# Patient Record
Sex: FEMALE | Race: WHITE
[De-identification: names, ages, dates, MRNs, and addresses within clinical notes are randomized per-mention and may not be internally consistent; named-entity substitution may affect disease eponyms.]

---

## 2017-08-04 ENCOUNTER — HOSPITAL ENCOUNTER (EMERGENCY)
Dept: HOSPITAL 74 - FER | Age: 27
Discharge: HOME | End: 2017-08-04
Payer: COMMERCIAL

## 2017-08-04 VITALS — SYSTOLIC BLOOD PRESSURE: 150 MMHG | DIASTOLIC BLOOD PRESSURE: 90 MMHG | HEART RATE: 110 BPM | TEMPERATURE: 98.4 F

## 2017-08-04 VITALS — BODY MASS INDEX: 41.8 KG/M2
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
## 2017-08-04 DIAGNOSIS — E66.01: ICD-10-CM
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
## 2017-08-04 DIAGNOSIS — M25.572: Primary | ICD-10-CM
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
## 2017-08-04 NOTE — PDOC
History of Present Illness





- General


Chief Complaint: Pain


Stated Complaint: PAIN TO LEFT LOWER LEG S/P FALL


Time Seen by Provider: 08/04/17 22:49


History Source: Patient


Exam Limitations: No Limitations





- History of Present Illness


Initial Comments: 





08/04/17 23:00


This is a morbidly obese 27-year-old female who was at a restaurant when she 

slipped on a wet floor and fell landing on her 3-year-old daughter child was 

not injured however mom said that she has fibromyalgia and chronically has pain 

in her leg but it is worse now that she fell. Mom is complaining of pain in her 

left ankle and knee. However mom was able to get up off of the floor and 

ambulate post falling. Mom said she did not hit her head or pass out and is 

complaining of pain in her left leg ankle and knee.





 PAST MEDICAL HISTORY:  no significant history





PAST SURGICAL HISTORY:  no significant history





FAMILY HISTORY:  no pertinant history





SOCIAL HISTORY:  Pt lives with  family and is employed.





MEDICATIONS:  reviewed





ALLERGIES:  As per nursing notes





Review of Systems


General:  No fevers or chills, no weakness, no weight loss 


HEENT: No change in vision.  No sore throat,. No ear pain


CardioVascular:  No chest pain or shortness of breath


Respiratory:No cough, or wheezing. 


Gastrointestinal:  no nausea, vomitting, diarrhea or constipation,  No rectal 

bleeding


Genitourinary:  No dysuria, hematuria, or frequency


Musculoskeletal: Left leg pain


Neurologic: No headache, vertigo, dizziness or loss of consciousness


Psychiatric: nor depression 


Skin: No rashes or easy bruising


Endocrine: no increased thirst or abnormal weight change


Allergic: no skin or latex allergy


All other systems reviewed and normal








GENERAL: The patient is awake, alert, and fully oriented, in no acute distress.


HEAD: Normal with no signs of trauma.


EYES: Pupils equal, round and reactive to light, extraocular movements intact, 

sclera anicteric, conjunctiva clear.


EXTREMITIES: Left ankle there is no swelling or ecchymosis of the ankle however 

patient complains of diffuse pain to the ankle area. On palpation. In addition 

to the ankle patient is complaining of pain on palpation of the left knee 

diffusely. Patient complains of pain when I try to range of motion either her 

ankle or knee. There is no swelling, ecchymosis of the knee. There is no 

ligamentous instability. Neurovascular is intact  


NEUROLOGICAL: Normal speech, normal gait.


PSYCH: Normal mood, normal affect.


SKIN: Warm, Dry, normal turgor, no rashes or lesions noted.





*DC/Admit/Observation/Transfer


Diagnosis at time of Disposition: 


 Left ankle pain





- Discharge Dispostion


Disposition: HOME


Condition at time of disposition: Good


Admit: No





- Patient Instructions


Additional Instructions: 


Tylenol or Motrin as needed for the pain  take as directed on the bottle.





Return to the emergency department immediately with ANY new, persistent or 

worsening symptoms.





Continue any medications as previously prescribed by your physician.





You should follow up with your primary doctor as soon as possible regarding 

today's emergency department visit.


.


Please make sure your doctor reviews the results of your emergency evaluation.





Thank you for coming to the   Emergency Department today for your care. It was 

a pleasure to see you today. Please note that your evaluation is INCOMPLETE 

until you  follow-up with your doctor.

## 2018-06-15 ENCOUNTER — HOSPITAL ENCOUNTER (EMERGENCY)
Dept: HOSPITAL 74 - FER | Age: 28
Discharge: HOME | End: 2018-06-15
Payer: COMMERCIAL

## 2018-06-15 VITALS — HEART RATE: 83 BPM | SYSTOLIC BLOOD PRESSURE: 158 MMHG | TEMPERATURE: 99 F | DIASTOLIC BLOOD PRESSURE: 99 MMHG

## 2018-06-15 VITALS — BODY MASS INDEX: 47.2 KG/M2

## 2018-06-15 DIAGNOSIS — R51: Primary | ICD-10-CM

## 2018-06-15 DIAGNOSIS — Z87.891: ICD-10-CM

## 2018-06-15 LAB
ALBUMIN SERPL-MCNC: 3.7 G/DL (ref 3.5–5)
ALP SERPL-CCNC: 56 U/L (ref 32–92)
ALT SERPL-CCNC: 21 U/L (ref 10–40)
ANION GAP SERPL CALC-SCNC: 7 MMOL/L (ref 8–16)
AST SERPL-CCNC: 17 U/L (ref 10–42)
BASOPHILS # BLD: 0.8 % (ref 0–2)
BILIRUB SERPL-MCNC: 0.6 MG/DL (ref 0.2–1)
BUN SERPL-MCNC: 13 MG/DL (ref 7–18)
CALCIUM SERPL-MCNC: 8.9 MG/DL (ref 8.4–10.2)
CHLORIDE SERPL-SCNC: 102 MMOL/L (ref 98–107)
CO2 SERPL-SCNC: 26 MMOL/L (ref 22–28)
CREAT SERPL-MCNC: 0.8 MG/DL (ref 0.6–1.3)
DEPRECATED RDW RBC AUTO: 12.3 % (ref 11.6–15.6)
EOSINOPHIL # BLD: 0.5 % (ref 0–4.5)
GLUCOSE SERPL-MCNC: 110 MG/DL (ref 74–106)
HCT VFR BLD CALC: 43.4 % (ref 32.4–45.2)
HGB BLD-MCNC: 14.8 GM/DL (ref 10.7–15.3)
LIPASE SERPL-CCNC: 206 U/L (ref 73–393)
LYMPHOCYTES # BLD: 34.6 % (ref 8–40)
MAGNESIUM SERPL-MCNC: 1.8 MG/DL (ref 1.8–2.4)
MCH RBC QN AUTO: 30.1 PG (ref 25.7–33.7)
MCHC RBC AUTO-ENTMCNC: 34 G/DL (ref 32–36)
MCV RBC: 88.6 FL (ref 80–96)
MONOCYTES # BLD AUTO: 6.7 % (ref 3.8–10.2)
NEUTROPHILS # BLD: 57.4 % (ref 42.8–82.8)
PLATELET # BLD AUTO: 203 K/MM3 (ref 134–434)
PMV BLD: 10 FL (ref 7.5–11.1)
POTASSIUM SERPLBLD-SCNC: 3.8 MMOL/L (ref 3.5–5.1)
PROT SERPL-MCNC: 7 G/DL (ref 6.4–8.3)
RBC # BLD AUTO: 4.9 M/MM3 (ref 3.6–5.2)
SODIUM SERPL-SCNC: 135 MMOL/L (ref 136–145)
WBC # BLD AUTO: 5.6 K/MM3 (ref 4–10.8)

## 2018-06-15 PROCEDURE — 3E033GC INTRODUCTION OF OTHER THERAPEUTIC SUBSTANCE INTO PERIPHERAL VEIN, PERCUTANEOUS APPROACH: ICD-10-PCS

## 2018-06-15 PROCEDURE — 3E0337Z INTRODUCTION OF ELECTROLYTIC AND WATER BALANCE SUBSTANCE INTO PERIPHERAL VEIN, PERCUTANEOUS APPROACH: ICD-10-PCS

## 2018-06-15 PROCEDURE — 3E0333Z INTRODUCTION OF ANTI-INFLAMMATORY INTO PERIPHERAL VEIN, PERCUTANEOUS APPROACH: ICD-10-PCS

## 2018-06-15 NOTE — PDOC
History of Present Illness





- General


Chief Complaint: Headache


Stated Complaint: HEADACHE & DIZZINESS


Time Seen by Provider: 06/15/18 10:08


History Source: Patient, Family


Exam Limitations: No Limitations





- History of Present Illness


Initial Comments: 





06/15/18 10:36





28-year-old female patient with history of fibromyalgia, obesity presents with 

headache since yesterday. Patient reports that she developed a gradual shooting-

like headache that was global. Has associated photophobia and phonophobia. Says 

the headache was constant. The headache persisted into today. Denies prior 

history of migraines or family history. Did have a history approximately 3 

years ago for somewhat type headache in which she had preeclampsia and was 

induced. Patient is currently using an IUD. Denies any numbness but really 

feels weak. Endorses nausea but denies vomiting. Denies fevers or chills.





Past History





- Past Medical History


Allergies/Adverse Reactions: 


 Allergies











Allergy/AdvReac Type Severity Reaction Status Date / Time


 


barley Allergy Unknown  Verified 06/15/18 10:11


 


blue dye Allergy Unknown  Verified 06/15/18 10:11


 


iodine Allergy Unknown  Verified 06/15/18 10:11


 


nortriptyline Allergy Unknown  Verified 06/15/18 10:11


 


TYLENOL COMPOUND Allergy Unknown  Uncoded 06/15/18 10:11











Home Medications: 


Ambulatory Orders





Metoclopramide HCl [Reglan] 10 mg PO Q6H PRN #15 tablet 06/15/18 


Naproxen 500 mg PO BID PRN #20 tablet 06/15/18 








COPD: No





- Suicide/Smoking/Psychosocial Hx


Smoking History: Former smoker


Have you smoked in the past 12 months: Yes


If you are a former smoker, when did you quit?: JAN 2018


Information on smoking cessation initiated: Yes


'Breaking Loose' booklet given: 06/15/18


Hx Alcohol Use: Yes


Drug/Substance Use Hx: No


Substance Use Type: None





**Review of Systems





- Review of Systems


Able to Perform ROS?: Yes


Comments:: 





06/15/18 10:39





GENERAL/CONSTITUTIONAL: No fever, weakness. 


HEAD, EYES, EARS, NOSE AND THROAT: No change in vision. No ear pain or 

discharge. No sore throat.


CARDIOVASCULAR: No chest pain or shortness of breath.


RESPIRATORY: No cough, wheezing, or hemoptysis.


GASTROINTESTINAL: No abdominal pain, nausea, vomiting, diarrhea, or decreased 

PO intolerance. 


GENITOURINARY: No dysuria, frequency, or change in urination.


MUSCULOSKELETAL: No joint or muscle swelling or pain. No neck or back pain.


SKIN: No rash


NEUROLOGIC: +headache. No vertigo, loss of consciousness, or change in strength/

sensation.


ENDOCRINE: No increased thirst. No abnormal weight change.


HEMATOLOGIC/LYMPHATIC: No anemia, easy bleeding, or history of blood clots.


ALLERGIC/IMMUNOLOGIC: No hives or skin allergy. 





*Physical Exam





- Vital Signs


 Last Vital Signs











Temp Pulse Resp BP Pulse Ox


 


 99.0 F   83   17   158/99   97 


 


 06/15/18 10:08  06/15/18 10:08  06/15/18 10:08  06/15/18 10:08  06/15/18 10:08














- Physical Exam


Comments: 





06/15/18 10:40





GENERAL: Awake, alert, and fully oriented. Obese.


EYES: PERRLA, EOMI, sclera anicteric, conjunctiva clear


ENT: Auricles normal inspection, hearing grossly normal, nares patent


NECK: Normal ROM, supple.


LUNGS: Breath sounds equal, clear to auscultation bilaterally.  No wheezes, and 

no crackles


HEART: Regular rate and rhythm, normal S1 and S2, no murmurs, rubs or gallops


ABDOMEN: Soft, nontender,.  No guarding, no rebound.  No masses


EXTREMITIES: Normal range of motion, no edema.  No clubbing or cyanosis. No 

cords, erythema, or tenderness


NEUROLOGICAL: Cranial nerves II through XII intact.  Normal speech. 5/5 

strength upper and lower extremities. No pronator drift. Finger to nose normal. 

No dysarthria. Rapid alternating intact.


SKIN: Warm, Dry, normal turgor, no rashes or lesions noted.





ED Treatment Course





- LABORATORY


CBC & Chemistry Diagram: 


 06/15/18 10:40





 06/15/18 10:40





- RADIOLOGY


Radiology Studies Ordered: 














 Category Date Time Status


 


 HEAD CT WITHOUT CONTRAST [CT] Stat CT Scan  06/15/18 10:23 Ordered














Medical Decision Making





- Medical Decision Making





06/15/18 10:42





 Vital Signs











Temp Pulse Resp BP Pulse Ox


 


 99.0 F   83   17   158/99   97 


 


 06/15/18 10:08  06/15/18 10:08  06/15/18 10:08  06/15/18 10:08  06/15/18 10:08








Differential includes migraines, pseudotumor cerebri vs. less likely 

intracranial pathology i.e. masses, tumors.


Low suspicion for ICH at this time given history and presentation.


Head CT


Labs


Medications to treat for migraines and reassess.


06/15/18 13:30





Head CT reviewed. No acute findings.





 CBC, BMP





 06/15/18 10:40 





 06/15/18 10:40 





 CMP











Sodium  135 mmol/L (136-145)  L  06/15/18  10:40    


 


Potassium  3.8 mmol/L (3.5-5.1)   06/15/18  10:40    


 


Chloride  102 mmol/L ()   06/15/18  10:40    


 


Carbon Dioxide  26 mmol/L (22-28)   06/15/18  10:40    


 


Anion Gap  7  (8-16)  L  06/15/18  10:40    


 


BUN  13 mg/dl (7-18)   06/15/18  10:40    


 


Creatinine  0.8 mg/dl (0.6-1.3)   06/15/18  10:40    


 


Creat Clearance w eGFR  > 60  (>60)   06/15/18  10:40    


 


Random Glucose  110 mg/dl ()  H  06/15/18  10:40    


 


Calcium  8.9 mg/dl (8.4-10.2)   06/15/18  10:40    


 


Magnesium  1.8 mg/dL (1.8-2.4)   06/15/18  10:40    


 


Total Bilirubin  0.6 mg/dl (0.2-1.0)   06/15/18  10:40    


 


AST  17 U/L (10-42)   06/15/18  10:40    


 


ALT  21 U/L (10-40)   06/15/18  10:40    


 


Alkaline Phosphatase  56 U/L (32-92)   06/15/18  10:40    


 


Total Protein  7.0 g/dl (6.4-8.3)   06/15/18  10:40    


 


Albumin  3.7 g/dl (3.5-5.0)   06/15/18  10:40    


 


Lipase  206 U/L ()   06/15/18  10:40    


 


Serum Pregnancy, Qual  Negative   06/15/18  11:20    








Pt reports feeling significantly better with IV reglan, benadryl, toradol and 

dexamethasone.


Pt does endorse to me that she did have a childhood history of frequent 

migraines.


Will treat as a migraine exacerbation. Will give a referral to a neurologist.


Return precautions given including worsening headache.


Pt's  will drive patient home.





I discussed the physical exam findings, ancillary test results and final 

diagnoses with the patient.  I answered all of the patient's questions.  The 

patient was satisfied with the care received and felt comfortable with the 

discharge plan and treatment plan.  The patient will call their primary care 

physician within 24 hours to arrange follow-up and will return to the Emergency 

Department with any new, persistant or worsening symptoms. 





*DC/Admit/Observation/Transfer


Diagnosis at time of Disposition: 


Headache


Qualifiers:


 Headache type: unspecified Headache chronicity pattern: acute headache 

Intractability: not intractable Qualified Code(s): R51 - Headache








- Discharge Dispostion


Disposition: HOME


Condition at time of disposition: Good


Decision to Admit order: No





- Prescriptions


Prescriptions: 


Metoclopramide HCl [Reglan] 10 mg PO Q6H PRN #15 tablet


 PRN Reason: Headache/Nausea


Naproxen 500 mg PO BID PRN #20 tablet


 PRN Reason: Headache





- Referrals


Referrals: 


Melissa Laguna MD [Primary Care Provider] - 


Thong Garner MD [Staff Physician] - 





- Patient Instructions


Printed Discharge Instructions:  DI for Headache


Additional Instructions: 


Your Head CT is negative for acute findings.


Please take 500 mg naproxen every 12 hours as needed for headache.


For additional relief, you may take a tablet of reglan every 6 to 8 hours as 

needed.


Please make an appointment with a neurologist.


Call to schedule an appointment.





If you have uncontrollable headache, persistent vomiting, please call your 

doctor or return to the ER for further evaluation.





- Post Discharge Activity


Forms/Work/School Notes:  Back to Work

## 2020-08-06 ENCOUNTER — HOSPITAL ENCOUNTER (EMERGENCY)
Dept: HOSPITAL 74 - FER | Age: 30
LOS: 1 days | Discharge: HOME | End: 2020-08-07
Payer: COMMERCIAL

## 2020-08-06 VITALS — SYSTOLIC BLOOD PRESSURE: 153 MMHG | DIASTOLIC BLOOD PRESSURE: 106 MMHG | TEMPERATURE: 98 F | HEART RATE: 89 BPM

## 2020-08-06 VITALS — BODY MASS INDEX: 53.1 KG/M2

## 2020-08-06 DIAGNOSIS — T78.40XA: Primary | ICD-10-CM

## 2020-08-06 PROCEDURE — 3E033GC INTRODUCTION OF OTHER THERAPEUTIC SUBSTANCE INTO PERIPHERAL VEIN, PERCUTANEOUS APPROACH: ICD-10-PCS

## 2020-08-06 NOTE — PDOC
History of Present Illness





- General


Chief Complaint: Allergic Reaction


Stated Complaint: REACTION TO SUMATRIPTIN


Time Seen by Provider: 08/06/20 23:04


History Source: Patient


Exam Limitations: No Limitations





- History of Present Illness


Initial Comments: 





08/06/20 23:16


This is a morbidly obese 30-year-old female who comes in complaining of an 

allergic reaction.  Patient took sumatriptan for her migraine and shortly 

thereafter developed some swelling of her lips some nausea vomited x1 and some 

itching of her throat.  Patient denies any shortness of breath.  Patient said 

that she did not take anything for the reaction and came in for evaluation.





Allergies: as per nursing notes


Past Medical History: none


Social history: Lives with family. No smoking. No alcohol. No illicit drugs.


Surgical history: None





General:  No fevers or chills, no weakness, no weight loss 


HEENT: No change in vision.  No sore throat,. No ear pain


CardioVascular: no chest discomfort. No shortness of breath


Respiratory:No cough, or wheezing. 


Gastrointestinal:  no nausea, vomiting, diarrhea or constipation,  No rectal 

bleeding


Genitourinary:  No dysuria, hematuria, or frequency


Musculoskeletal:  No joint or muscle pain or swelling


Neurologic: No headache, vertigo, dizziness or loss of consciousness


Psychiatric: nor depression 


Skin: No rashes or easy bruising


Endocrine: no increased thirst or abnormal weight change


Allergic: no skin or latex allergy


All other systems reviewed and normal





Exam:





General: Well-nourished well-developed individual, no acute distress


HEENT: Throat: There is some mild angioedema of the posterior oropharynx with 

and some mild angioedema of the lips


               Neck: Supple, no meningeal signs, no lymphadenopathy


Eyes::Pupils equal reactive and round, extraocular motion intact


Chest: Nontender to palpation 


Cardiac: S1-S2 normal, regular rate and rhythm, no murmurs rubs or gallops


Respiratory: Lungs clear to auscultation bilateral


Abdomen: Soft, nondistended, normal bowel sounds, there is no tenderness on 

palpation diffusely


Extremities: Warm, dry, no cyanosis, clubbing, or edema


Skin: No rashes


Neuro: Alert and oriented x3, CN II - XII intact, nonfocal exam with normal 

strength, normal sensation, normal reflexes, normal gait, 


Psych: Normal mood and affect





Assessment and plan: This is a 30-year-old female with an allergic reaction.  

Patient given Solu-Medrol, Benadryl and Reglan.  Will observe patient for a 

couple of hours prior to discharge.





Past History





- Medical History


Allergies/Adverse Reactions: 


                                    Allergies











Allergy/AdvReac Type Severity Reaction Status Date / Time


 


barley Allergy Unknown  Verified 06/15/18 10:11


 


blue dye Allergy Unknown  Verified 06/15/18 10:11


 


iodine Allergy Unknown  Verified 06/15/18 10:11


 


nortriptyline Allergy Unknown  Verified 06/15/18 10:11


 


guaifenesin [From Mucinex] Allergy   Verified 08/06/20 22:59


 


TYLENOL COMPOUND Allergy Unknown  Uncoded 06/15/18 10:11











Home Medications: 


Ambulatory Orders





Fluticasone Prop 0.05% Nasal [Flonase -] 1 - 2 spray NS DAILY 08/06/20 


Loratadine 10 mg PO DAILY 08/06/20 


Methylprednisolone [Medrol Dose Ferm[place]] 4 mg PO ASDIR #21 tablet 08/06/20 


Montelukast Na [Singulair -] 10 mg PO HS 08/06/20 








Asthma: No (ENVIRONMENTAL ALLERGIES)


COPD: No


Psychiatric Problems: Yes


Other medical history: FIBROMYALGIA, MORBIDLY OBESE





- Reproductive History


Is Patient Pregnant Now?: No





- Psycho-Social/Smoking History


Smoking History: Former smoker


Have you smoked in the past 12 months: Yes


If you are a former smoker, when did you quit?: JAN 2018


Information on smoking cessation initiated: Yes


'Breaking Loose' booklet given: 06/15/18





*Physical Exam





- Vital Signs


                                Last Vital Signs











Temp Pulse Resp BP Pulse Ox


 


 98 F   89   18   153/106 H  99 


 


 08/06/20 23:02  08/06/20 23:02  08/06/20 23:02  08/06/20 23:02  08/06/20 23:02














Discharge





- Discharge Information


Problems reviewed: Yes


Clinical Impression/Diagnosis: 


Allergic reaction caused by a drug


Qualifiers:


 Encounter type: initial encounter Qualified Code(s): T78.40XA - Allergy, 

unspecified, initial encounter





Condition: Stable


Disposition: HOME





- Admission


No





- Additional Discharge Information


Prescriptions: 


Methylprednisolone [Medrol Dose Fermín] 4 mg PO ASDIR #21 tablet





- Follow up/Referral


Referrals: 


Melissa Laguna MD [Primary Care Provider] - 





- Patient Discharge Instructions


Additional Instructions: 


Take the Medrol Dosepak and taper as per the pack.





In addition to the Medrol Dosepak if you have any additional symptoms you can 

take Benadryl 1 tablet every 4 hours.





Return to the emergency department immediately with ANY new, persistent or 

worsening symptoms.





Continue any medications as previously prescribed by your physician.





You should follow up with your primary doctor as soon as possible regarding 

today's emergency department visit.


.


Please make sure your doctor reviews the results of your emergency evaluation.





Thank you for coming to the   Emergency Department today for your care. It was a

pleasure to see you today. Please note that your evaluation is INCOMPLETE until 

you  follow-up with your doctor. 





- Post Discharge Activity